# Patient Record
Sex: MALE | Race: WHITE | NOT HISPANIC OR LATINO | Employment: FULL TIME | URBAN - METROPOLITAN AREA
[De-identification: names, ages, dates, MRNs, and addresses within clinical notes are randomized per-mention and may not be internally consistent; named-entity substitution may affect disease eponyms.]

---

## 2022-04-11 ENCOUNTER — HOSPITAL ENCOUNTER (EMERGENCY)
Facility: CLINIC | Age: 42
Discharge: HOME | End: 2022-04-11
Attending: EMERGENCY MEDICINE | Admitting: EMERGENCY MEDICINE
Payer: COMMERCIAL

## 2022-04-11 VITALS
WEIGHT: 265 LBS | HEIGHT: 73 IN | BODY MASS INDEX: 35.12 KG/M2 | DIASTOLIC BLOOD PRESSURE: 76 MMHG | RESPIRATION RATE: 13 BRPM | HEART RATE: 74 BPM | OXYGEN SATURATION: 99 % | TEMPERATURE: 97.4 F | SYSTOLIC BLOOD PRESSURE: 128 MMHG

## 2022-04-11 DIAGNOSIS — R21 RASH OF NECK: ICD-10-CM

## 2022-04-11 DIAGNOSIS — R07.0 THROAT DISCOMFORT: ICD-10-CM

## 2022-04-11 LAB
ANION GAP SERPL CALCULATED.3IONS-SCNC: 15 MMOL/L (ref 5–18)
BASOPHILS # BLD AUTO: 0.1 10E3/UL (ref 0–0.2)
BASOPHILS NFR BLD AUTO: 1 %
BUN SERPL-MCNC: 13 MG/DL (ref 8–22)
C REACTIVE PROTEIN LHE: 1.3 MG/DL (ref 0–0.8)
CALCIUM SERPL-MCNC: 9.3 MG/DL (ref 8.5–10.5)
CHLORIDE BLD-SCNC: 102 MMOL/L (ref 98–107)
CO2 SERPL-SCNC: 21 MMOL/L (ref 22–31)
CREAT SERPL-MCNC: 0.91 MG/DL (ref 0.7–1.3)
EOSINOPHIL # BLD AUTO: 0.3 10E3/UL (ref 0–0.7)
EOSINOPHIL NFR BLD AUTO: 3 %
ERYTHROCYTE [DISTWIDTH] IN BLOOD BY AUTOMATED COUNT: 13.6 % (ref 10–15)
GFR SERPL CREATININE-BSD FRML MDRD: >90 ML/MIN/1.73M2
GLUCOSE BLD-MCNC: 109 MG/DL (ref 70–125)
HCT VFR BLD AUTO: 43.7 % (ref 40–53)
HGB BLD-MCNC: 14.7 G/DL (ref 13.3–17.7)
IMM GRANULOCYTES # BLD: 0 10E3/UL
IMM GRANULOCYTES NFR BLD: 0 %
LYMPHOCYTES # BLD AUTO: 2.5 10E3/UL (ref 0.8–5.3)
LYMPHOCYTES NFR BLD AUTO: 31 %
MCH RBC QN AUTO: 30.4 PG (ref 26.5–33)
MCHC RBC AUTO-ENTMCNC: 33.6 G/DL (ref 31.5–36.5)
MCV RBC AUTO: 91 FL (ref 78–100)
MONOCYTES # BLD AUTO: 0.7 10E3/UL (ref 0–1.3)
MONOCYTES NFR BLD AUTO: 9 %
NEUTROPHILS # BLD AUTO: 4.7 10E3/UL (ref 1.6–8.3)
NEUTROPHILS NFR BLD AUTO: 56 %
NRBC # BLD AUTO: 0 10E3/UL
NRBC BLD AUTO-RTO: 0 /100
PLATELET # BLD AUTO: 283 10E3/UL (ref 150–450)
POTASSIUM BLD-SCNC: 3.4 MMOL/L (ref 3.5–5)
RBC # BLD AUTO: 4.83 10E6/UL (ref 4.4–5.9)
SODIUM SERPL-SCNC: 138 MMOL/L (ref 136–145)
WBC # BLD AUTO: 8.3 10E3/UL (ref 4–11)

## 2022-04-11 PROCEDURE — 99285 EMERGENCY DEPT VISIT HI MDM: CPT | Mod: 25

## 2022-04-11 PROCEDURE — 85025 COMPLETE CBC W/AUTO DIFF WBC: CPT | Performed by: EMERGENCY MEDICINE

## 2022-04-11 PROCEDURE — 80048 BASIC METABOLIC PNL TOTAL CA: CPT | Performed by: EMERGENCY MEDICINE

## 2022-04-11 PROCEDURE — 96372 THER/PROPH/DIAG INJ SC/IM: CPT | Mod: 59

## 2022-04-11 PROCEDURE — 250N000009 HC RX 250: Performed by: EMERGENCY MEDICINE

## 2022-04-11 PROCEDURE — 250N000009 HC RX 250

## 2022-04-11 PROCEDURE — 96375 TX/PRO/DX INJ NEW DRUG ADDON: CPT

## 2022-04-11 PROCEDURE — 86140 C-REACTIVE PROTEIN: CPT | Performed by: EMERGENCY MEDICINE

## 2022-04-11 PROCEDURE — 96374 THER/PROPH/DIAG INJ IV PUSH: CPT

## 2022-04-11 PROCEDURE — 36415 COLL VENOUS BLD VENIPUNCTURE: CPT | Performed by: EMERGENCY MEDICINE

## 2022-04-11 PROCEDURE — 250N000011 HC RX IP 250 OP 636: Performed by: EMERGENCY MEDICINE

## 2022-04-11 RX ORDER — DIPHENHYDRAMINE HYDROCHLORIDE 50 MG/ML
50 INJECTION INTRAMUSCULAR; INTRAVENOUS ONCE
Status: COMPLETED | OUTPATIENT
Start: 2022-04-11 | End: 2022-04-11

## 2022-04-11 RX ORDER — PREDNISONE 20 MG/1
40 TABLET ORAL DAILY
Qty: 8 TABLET | Refills: 0 | Status: SHIPPED | OUTPATIENT
Start: 2022-04-11 | End: 2022-04-15

## 2022-04-11 RX ORDER — METHYLPREDNISOLONE SODIUM SUCCINATE 125 MG/2ML
125 INJECTION, POWDER, LYOPHILIZED, FOR SOLUTION INTRAMUSCULAR; INTRAVENOUS ONCE
Status: COMPLETED | OUTPATIENT
Start: 2022-04-11 | End: 2022-04-11

## 2022-04-11 RX ADMIN — DIPHENHYDRAMINE HYDROCHLORIDE 50 MG: 50 INJECTION, SOLUTION INTRAMUSCULAR; INTRAVENOUS at 10:58

## 2022-04-11 RX ADMIN — EPINEPHRINE 0.3 MG: 1 INJECTION, SOLUTION INTRAMUSCULAR; SUBCUTANEOUS at 10:49

## 2022-04-11 RX ADMIN — METHYLPREDNISOLONE SODIUM SUCCINATE 125 MG: 125 INJECTION, POWDER, FOR SOLUTION INTRAMUSCULAR; INTRAVENOUS at 10:55

## 2022-04-11 RX ADMIN — FAMOTIDINE 20 MG: 10 INJECTION, SOLUTION INTRAVENOUS at 10:57

## 2022-04-11 ASSESSMENT — ENCOUNTER SYMPTOMS
VOMITING: 0
EYE PAIN: 1
NAUSEA: 0
VOICE CHANGE: 1
ABDOMINAL PAIN: 0
SHORTNESS OF BREATH: 1

## 2022-04-11 NOTE — ED NOTES
PT still feeling mild tongue swelling. Voice slightly muffled. Denies difficulty breathing. 98% RA.

## 2022-04-11 NOTE — ED TRIAGE NOTES
The patient presents to the ED with c/o rash to upper chest that has gradually been getting worse since Thursday. Today while at work became short of breath and shaky. Has not tried any OTC medications for the rash. Able to speak in full sentences. Denies any allergies to medication or environmental.

## 2022-04-11 NOTE — Clinical Note
Tony Estes was seen and treated in our emergency department on 4/11/2022.  He may return to work on 04/13/2022.       If you have any questions or concerns, please don't hesitate to call.      Farideh Fraire, DO

## 2022-04-11 NOTE — ED PROVIDER NOTES
EMERGENCY DEPARTMENT ENCOUNTER      NAME: Tony Estes  AGE: 42 year old male  YOB: 1980  MRN: [id number]  EVALUATION DATE & TIME: 2022 10:35 AM    PCP: No primary care provider on file.    ED PROVIDER: Farideh Fraire DO      Chief Complaint   Patient presents with     Rash     Upper chest     Dizziness     Shortness of Breath         FINAL IMPRESSION:  1. Rash of neck    2. 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  The patient stated that the rash began a few days ago and that was located primarily on his neck, upper chest, and upper back.  Today the patient states that he his total appear to swell and his voice change.  He stated that he also also became slightly short of breath.  Upon arrival to the ED the patient was noted to be hypertensive but he was otherwise hemodynamically stable.  Patient was not in any obvious distress at the time of his initial evaluation.  On exam the patient was noted to have an erythematous papular rash noted on his neck, upper chest, and upper back.  The rash did not appear like a classic urticaria.  There is no obvious angioedema noted and the patient had no stridor, but his voice did sound slightly muffled.  The lungs were clear to auscultation.    Following his initial valuation the patient was given a dose of IM epinephrine to treat possible allergic reaction.  This was followed by IV Benadryl, methylprednisolone, and famotidine to treat a possible allergic reaction.    A few minutes after receiving the epinephrine the patient was reevaluated.  He reported minimal change in any of his symptoms with the epinephrine.      Basic labs including CBC, BMP, and CRP were reassuring.    The patient was again reevaluated after the nursing staff stated that the patient was feeling better and was requesting to return home.  Patient states that his symptoms had  improved with the Benadryl and famotidine.  He stated that he was no longer short of breath at the time of reevaluation.  No change was noted in the patient's rash.  The patient did note that he used a new body wash approximately 1 day before the onset of his rash.  Patient was informed that his symptoms could represent a contact dermatitis versus.  An allergic reaction is also possible although it seems less likely at this time.  Regardless of etiology the patient was sent home with a additional 4-day course of oral prednisone.  He was instructed to use oral Benadryl every 6 hours as needed for any itching.  The patient was instructed to follow-up with a primary care physician for routine reevaluation or to return back to ED sooner for any worsening angioedema, respiratory difficulty, or any other new or concerning symptoms.      Pertinent Labs & Imaging studies reviewed. (See chart for details)  10:49 AM I met with the patient to gather history and to perform my initial exam. We discussed plans for the ED course, including diagnostic testing and treatment.   1:39 PM Rechecked and updated the patient. We discussed the plan for discharge and the patient is agreeable. Reviewed supportive cares, symptomatic treatment, outpatient follow up, and reasons to return to the Emergency Department. Patient to be discharged by ED RN.        At the conclusion of the encounter I discussed the results of all of the tests and the disposition. The questions were answered. The patient or family acknowledged understanding and was agreeable with the care plan.     Critical Care  Performed by: Farideh Fraire DO  Authorized by: Farideh Fraire DO     Total critical care time: 30 minutes  Critical care time was exclusive of separately billable procedures and treating other patients.  Critical care was necessary to treat or prevent imminent or life-threatening deterioration of the following conditions: Possible anaphylactic reaction  Critical  care was time spent personally by me on the following activities: development of treatment plan with patient or surrogate, discussions with consultants, examination of patient, evaluation of patient's response to treatment, obtaining history from patient or surrogate, ordering and performing treatments and interventions, ordering and review of laboratory studies, ordering and review of radiographic studies and re-evaluation of patient's condition, this excludes any separately billable procedures.    PPE worn: n95 mask, goggles    MEDICATIONS GIVEN IN THE EMERGENCY:  Medications   EPINEPHrine (ADRENALIN) kit 0.3 mg (0.3 mg Intramuscular Given 4/11/22 1049)   famotidine (PEPCID) injection 20 mg (20 mg Intravenous Given 4/11/22 1057)   methylPREDNISolone sodium succinate (solu-MEDROL) injection 125 mg (125 mg Intravenous Given 4/11/22 1055)   diphenhydrAMINE (BENADRYL) injection 50 mg (50 mg Intravenous Given 4/11/22 1058)       NEW PRESCRIPTIONS STARTED AT TODAY'S ER VISIT  Discharge Medication List as of 4/11/2022  2:02 PM      START taking these medications    Details   predniSONE (DELTASONE) 20 MG tablet Take 2 tablets (40 mg) by mouth in the morning for 4 days. Take two tablets (= 40mg) each day for 5 (five) days, Disp-8 tablet, R-0, E-Prescribe                =================================================================    HPI    Patient information was obtained from: Patient    Use of : N/A       Tony HARO Franklyn is a 42 year old male with a pertinent history of obesity and tobacco use who presents to this ED by walk in for evaluation of rash and shortness of breath.    The patient reports noticing a small rash on the right side of his neck Thursday (4 days ago). This morning while at work he noticed the rash had spread across his neck and chest and down his arms. His tongue also began to feel dry and swollen, and he noticed his voice became deeper. He has a stinging sensation in his eyes. He  "also noticed he was having some difficulty breathing. He has no known allergy history. He has not used any new lotions or soaps.    He otherwise denies nausea, vomiting, abdominal pain, and any other complaints at this time.    He is a smoker.    REVIEW OF SYSTEMS   Review of Systems   HENT: Positive for voice change.         Positive for tongue swelling   Eyes: Positive for pain (stinging).   Respiratory: Positive for shortness of breath.    Gastrointestinal: Negative for abdominal pain, nausea and vomiting.   Skin: Positive for rash.   Allergic/Immunologic:        Negative for known allergies   All other systems reviewed and are negative.       PAST MEDICAL HISTORY:  No past medical history on file.    PAST SURGICAL HISTORY:  No past surgical history on file.        CURRENT MEDICATIONS:    predniSONE (DELTASONE) 20 MG tablet  ibuprofen (ADVIL,MOTRIN) 200 MG tablet  oxyCODONE (ROXICODONE) 5 MG immediate release tablet  oxyCODONE-acetaminophen (PERCOCET) 5-325 mg per tablet        ALLERGIES:  No Known Allergies    FAMILY HISTORY:  Family History   Problem Relation Age of Onset     Deep Vein Thrombosis Mother         DVT requiring amputation       SOCIAL HISTORY:   Social History     Socioeconomic History     Marital status: Single   Tobacco Use     Smoking status: Current Every Day Smoker     Packs/day: 1.00     Types: Cigarettes, Cigarettes   Substance and Sexual Activity     Alcohol use: Yes     Drug use: No       VITALS:  /76   Pulse 74   Temp 97.4  F (36.3  C) (Oral)   Resp 13   Ht 1.854 m (6' 1\")   Wt 120.2 kg (265 lb)   SpO2 99%   BMI 34.96 kg/m      PHYSICAL EXAM    General presentation: Alert, Vital signs reviewed. NAD  HENT: ENT inspection is normal. Oropharynx is moist and clear. No angioedema to the lips or tongue. Speech sounds slightly muffled.  Eye: Pupils are equal and reactive to light. EOMI  Neck: The neck is supple, with full ROM, with no evidence of meningismus. No " stridor.  Pulmonary: Currently in no acute respiratory distress. Normal, non labored respirations, the lung sounds are normal with good equal air movement. Clear to auscultation bilaterally.   Circulatory: Regular rate and rhythm. Peripheral pulses are strong and equal. No murmurs, rubs, or gallops.   Abdominal: The abdomen is soft. Nontender. No rigidity, guarding, or rebound. Bowel sounds normal.   Neurologic: Alert, oriented to person, place, and time. No motor deficit. No sensory deficit. Cranial nerves II through XII are intact.  Musculoskeletal: No extremity tenderness. Full range of motion in all extremities. No extremity edema.   Skin: Skin color is normal. No rash. Warm. Dry to touch. Erythematous papular rash noted on the neck, chest upper back, and upper arms.     LAB:  All pertinent labs reviewed and interpreted.  Results for orders placed or performed during the hospital encounter of 04/11/22   Basic metabolic panel   Result Value Ref Range    Sodium 138 136 - 145 mmol/L    Potassium 3.4 (L) 3.5 - 5.0 mmol/L    Chloride 102 98 - 107 mmol/L    Carbon Dioxide (CO2) 21 (L) 22 - 31 mmol/L    Anion Gap 15 5 - 18 mmol/L    Urea Nitrogen 13 8 - 22 mg/dL    Creatinine 0.91 0.70 - 1.30 mg/dL    Calcium 9.3 8.5 - 10.5 mg/dL    Glucose 109 70 - 125 mg/dL    GFR Estimate >90 >60 mL/min/1.73m2   CBC with platelets and differential   Result Value Ref Range    WBC Count 8.3 4.0 - 11.0 10e3/uL    RBC Count 4.83 4.40 - 5.90 10e6/uL    Hemoglobin 14.7 13.3 - 17.7 g/dL    Hematocrit 43.7 40.0 - 53.0 %    MCV 91 78 - 100 fL    MCH 30.4 26.5 - 33.0 pg    MCHC 33.6 31.5 - 36.5 g/dL    RDW 13.6 10.0 - 15.0 %    Platelet Count 283 150 - 450 10e3/uL    % Neutrophils 56 %    % Lymphocytes 31 %    % Monocytes 9 %    % Eosinophils 3 %    % Basophils 1 %    % Immature Granulocytes 0 %    NRBCs per 100 WBC 0 <1 /100    Absolute Neutrophils 4.7 1.6 - 8.3 10e3/uL    Absolute Lymphocytes 2.5 0.8 - 5.3 10e3/uL    Absolute Monocytes 0.7  0.0 - 1.3 10e3/uL    Absolute Eosinophils 0.3 0.0 - 0.7 10e3/uL    Absolute Basophils 0.1 0.0 - 0.2 10e3/uL    Absolute Immature Granulocytes 0.0 <=0.4 10e3/uL    Absolute NRBCs 0.0 10e3/uL   CRP inflammation   Result Value Ref Range    CRP 1.3 (H) 0.0 - 0.8 mg/dL         I, Phoenix Fernandez, am serving as a scribe to document services personally performed by Farideh Fraire DO based on my observation and the provider's statements to me. I, Fairdeh Fraire, attest that Phoenix Fernandez is acting in a scribe capacity, has observed my performance of the services and has documented them in accordance with my direction.    Farideh Fraire DO  Emergency Medicine  [facility] EMERGENCY ROOM  [address] [facility] [facility] [id number] [phone]     Farideh Fraire DO  04/11/22 1821       Farideh Fraire DO  04/11/22 1827

## 2022-07-27 NOTE — DISCHARGE INSTRUCTIONS
Your laboratory test here in the ED appear reassuring.  The exact cause of your rash remains unclear.  Contact dermatitis is suspected but an allergic reaction is also still a possibility.  Use the prednisone (steroid) daily as directed for the next 4 days to treat your symptoms.  You can also use over-the-counter Benadryl every 6 hours as needed for itching.  Follow-up with your primary care physician for reevaluation or direct ED sooner for any worsening lip/tongue/throat swelling, respiratory difficulty, worsening rash, or any other new or concerning symptoms.  
Detail Level: Detailed
Detail Level: Zone